# Patient Record
Sex: MALE | Race: OTHER | Employment: OTHER | URBAN - NONMETROPOLITAN AREA
[De-identification: names, ages, dates, MRNs, and addresses within clinical notes are randomized per-mention and may not be internally consistent; named-entity substitution may affect disease eponyms.]

---

## 2021-03-15 RX ORDER — FUROSEMIDE 40 MG/1
40 TABLET ORAL DAILY
COMMUNITY

## 2021-03-15 RX ORDER — POLYETHYLENE GLYCOL 3350 17 G/17G
17 POWDER, FOR SOLUTION ORAL PRN
COMMUNITY

## 2021-03-15 RX ORDER — BACLOFEN 10 MG/1
TABLET ORAL 4 TIMES DAILY PRN
COMMUNITY

## 2021-03-15 RX ORDER — ATORVASTATIN CALCIUM 20 MG/1
20 TABLET, FILM COATED ORAL DAILY
COMMUNITY

## 2021-03-15 RX ORDER — FOLIC ACID 1 MG/1
1 TABLET ORAL DAILY
COMMUNITY

## 2021-03-15 RX ORDER — METOPROLOL TARTRATE 50 MG/1
TABLET, FILM COATED ORAL 2 TIMES DAILY
COMMUNITY

## 2021-03-15 RX ORDER — TRAMADOL HYDROCHLORIDE 50 MG/1
50 TABLET ORAL EVERY 6 HOURS PRN
Status: ON HOLD | COMMUNITY
End: 2021-03-19 | Stop reason: HOSPADM

## 2021-03-15 RX ORDER — MELOXICAM 15 MG/1
15 TABLET ORAL DAILY
COMMUNITY

## 2021-03-15 RX ORDER — ZOLPIDEM TARTRATE 10 MG/1
TABLET ORAL NIGHTLY PRN
COMMUNITY

## 2021-03-15 RX ORDER — ASPIRIN 81 MG/1
81 TABLET ORAL DAILY
COMMUNITY

## 2021-03-15 NOTE — PROGRESS NOTES
Following instructions given to patient, who states understanding:    NPO after midnight  [employer] and 's license  Wear comfortable clean clothing  Do not bring jewelry   Shower night before and morning of surgery with a liquid antibacterial soap  Bring medications in original bottles  Follow all instructions given by your physician   needed at discharge  Call [phone] for any questions  Report to [facility] on 2nd floor  If you would become ill prior to surgery, please call the surgeon  May have only 1 visitor accompany you for surgery  Please bring and wear mask  You will be receiving a phone call one or two days before surgery to review covid screening exposure

## 2021-03-15 NOTE — PROGRESS NOTES
In preparation for their surgical procedure above patient was screened for Obstructive Sleep Apnea (GIN) using the STOP-Bang Questionnaire by the Pre-Admission Testing department. This is a pre-surgical screening tool for patient safety and serves as a recommendation, this WILL NOT cause cancellation of surgery. STOP-Bang Questionnaire  * Do you currently see a pulmonologist?  No     If yes STOP, do not complete. Patient follows with Dr.     1.  Do you snore loudly (able to be heard in the next room)? No    2. Do you often feel tired or sleepy during the daytime? No       3. Has anyone ever told you that you stop breathing during your sleep? No    4. Do you have or are you being treated for high blood pressure? Yes      5. BMI more than 35? BMI (Calculated): 31.9        No    6. Age over 48 years? 63 y.o. Yes    7. Neck Circumference greater than 17 inches for male or 16 inches for female? Measured           (visits only)            Not Applicable    8. Gender Male? Yes      TOTAL SCORE: 3    GIN - Low Risk : Yes to 0 - 2 questions  GIN - Intermediate Risk : Yes to 3 - 4 questions  GIN - High Risk : Yes to 5 - 8 questions    Adapted from:   STOP Questionnaire: A Tool to Screen Patients for Obstructive Sleep Apnea   JENNA BraswellC.P.C., John Chavez M.B.B.S., London Brown M.D., Franca Mary. Nakul Chau, Ph.D., PRAMOD Snyder.B.B.S., Ingrid Rowland M.Sc., Herman Gan M.D., Kamar Leo. JENNA JohnsonC.P.C.    Anesthesiology 2008; 401:211-52 Copyright 2008, the [address] [employer]est[employer], tan[address] 37.   ----------------------------------------------------------------------------------------------------------------

## 2021-03-17 ENCOUNTER — ANESTHESIA EVENT (OUTPATIENT)
Dept: OPERATING ROOM | Age: 66
End: 2021-03-17
Payer: OTHER GOVERNMENT

## 2021-03-18 ENCOUNTER — APPOINTMENT (OUTPATIENT)
Dept: GENERAL RADIOLOGY | Age: 66
End: 2021-03-18
Attending: ORTHOPAEDIC SURGERY
Payer: OTHER GOVERNMENT

## 2021-03-18 ENCOUNTER — HOSPITAL ENCOUNTER (OUTPATIENT)
Age: 66
Discharge: HOME | End: 2021-03-19
Attending: ORTHOPAEDIC SURGERY | Admitting: ORTHOPAEDIC SURGERY
Payer: OTHER GOVERNMENT

## 2021-03-18 ENCOUNTER — ANESTHESIA (OUTPATIENT)
Dept: OPERATING ROOM | Age: 66
End: 2021-03-18
Payer: OTHER GOVERNMENT

## 2021-03-18 VITALS — TEMPERATURE: 97.5 F | SYSTOLIC BLOOD PRESSURE: 101 MMHG | OXYGEN SATURATION: 98 % | DIASTOLIC BLOOD PRESSURE: 58 MMHG

## 2021-03-18 DIAGNOSIS — M75.111 NONTRAUMATIC INCOMPLETE TEAR OF RIGHT ROTATOR CUFF: Primary | ICD-10-CM

## 2021-03-18 LAB — POTASSIUM SERPL-SCNC: 3.8 MEQ/L (ref 3.5–5.2)

## 2021-03-18 PROCEDURE — 2580000003 HC RX 258: Performed by: PHYSICIAN ASSISTANT

## 2021-03-18 PROCEDURE — 2500000003 HC RX 250 WO HCPCS: Performed by: ORTHOPAEDIC SURGERY

## 2021-03-18 PROCEDURE — 64415 NJX AA&/STRD BRCH PLXS IMG: CPT | Performed by: ANESTHESIOLOGY

## 2021-03-18 PROCEDURE — 2580000003 HC RX 258: Performed by: ORTHOPAEDIC SURGERY

## 2021-03-18 PROCEDURE — 73030 X-RAY EXAM OF SHOULDER: CPT

## 2021-03-18 PROCEDURE — 6360000002 HC RX W HCPCS: Performed by: ANESTHESIOLOGY

## 2021-03-18 PROCEDURE — 2709999900 HC NON-CHARGEABLE SUPPLY: Performed by: ORTHOPAEDIC SURGERY

## 2021-03-18 PROCEDURE — C1894 INTRO/SHEATH, NON-LASER: HCPCS | Performed by: ORTHOPAEDIC SURGERY

## 2021-03-18 PROCEDURE — 2500000003 HC RX 250 WO HCPCS: Performed by: NURSE ANESTHETIST, CERTIFIED REGISTERED

## 2021-03-18 PROCEDURE — 2720000010 HC SURG SUPPLY STERILE: Performed by: ORTHOPAEDIC SURGERY

## 2021-03-18 PROCEDURE — 36415 COLL VENOUS BLD VENIPUNCTURE: CPT

## 2021-03-18 PROCEDURE — 3700000000 HC ANESTHESIA ATTENDED CARE: Performed by: ORTHOPAEDIC SURGERY

## 2021-03-18 PROCEDURE — 6370000000 HC RX 637 (ALT 250 FOR IP): Performed by: PHYSICIAN ASSISTANT

## 2021-03-18 PROCEDURE — 7100000001 HC PACU RECOVERY - ADDTL 15 MIN: Performed by: ORTHOPAEDIC SURGERY

## 2021-03-18 PROCEDURE — 3600000005 HC SURGERY LEVEL 5 BASE: Performed by: ORTHOPAEDIC SURGERY

## 2021-03-18 PROCEDURE — 6360000002 HC RX W HCPCS: Performed by: PHYSICIAN ASSISTANT

## 2021-03-18 PROCEDURE — 3700000001 HC ADD 15 MINUTES (ANESTHESIA): Performed by: ORTHOPAEDIC SURGERY

## 2021-03-18 PROCEDURE — 6360000002 HC RX W HCPCS: Performed by: ORTHOPAEDIC SURGERY

## 2021-03-18 PROCEDURE — 6360000002 HC RX W HCPCS: Performed by: NURSE ANESTHETIST, CERTIFIED REGISTERED

## 2021-03-18 PROCEDURE — 84132 ASSAY OF SERUM POTASSIUM: CPT

## 2021-03-18 PROCEDURE — C1776 JOINT DEVICE (IMPLANTABLE): HCPCS | Performed by: ORTHOPAEDIC SURGERY

## 2021-03-18 PROCEDURE — 3600000015 HC SURGERY LEVEL 5 ADDTL 15MIN: Performed by: ORTHOPAEDIC SURGERY

## 2021-03-18 PROCEDURE — C1713 ANCHOR/SCREW BN/BN,TIS/BN: HCPCS | Performed by: ORTHOPAEDIC SURGERY

## 2021-03-18 PROCEDURE — 7100000000 HC PACU RECOVERY - FIRST 15 MIN: Performed by: ORTHOPAEDIC SURGERY

## 2021-03-18 PROCEDURE — 6360000002 HC RX W HCPCS

## 2021-03-18 DEVICE — IMPLANTABLE DEVICE: Type: IMPLANTABLE DEVICE | Site: SHOULDER | Status: FUNCTIONAL

## 2021-03-18 DEVICE — SHOULDR S3 TOT ADV REVRS IMPL CAPPED S3: Type: IMPLANTABLE DEVICE | Status: FUNCTIONAL

## 2021-03-18 DEVICE — SCREW BNE L25MM DIA6.5MM HEX HD DIA3.5MM FOR COMPHSVE CONV: Type: IMPLANTABLE DEVICE | Site: SHOULDER | Status: FUNCTIONAL

## 2021-03-18 DEVICE — STEM HUM 13MM MINI SHLDR CO CHROM COMPHSVE REV PRI CEM: Type: IMPLANTABLE DEVICE | Site: SHOULDER | Status: FUNCTIONAL

## 2021-03-18 DEVICE — SCREW BNE L30MM DIA4.75MM HD DIA3.5MM CORT FIX ANG: Type: IMPLANTABLE DEVICE | Site: SHOULDER | Status: FUNCTIONAL

## 2021-03-18 DEVICE — Z DUP USE 2418442 HMRL TRAY STD STD: Type: IMPLANTABLE DEVICE | Site: SHOULDER | Status: FUNCTIONAL

## 2021-03-18 DEVICE — Z DUP USE 2418441 HMRL BEARING 36MM STD PRLNG: Type: IMPLANTABLE DEVICE | Site: SHOULDER | Status: FUNCTIONAL

## 2021-03-18 DEVICE — SPHERE GLEN DIA36MM REG STD CO CHROM TAPR FIT FOR COMPHSVE: Type: IMPLANTABLE DEVICE | Site: SHOULDER | Status: FUNCTIONAL

## 2021-03-18 DEVICE — SCREW BNE L25MM DIA4.75MM HD DIA3.5MM CORT FIX ANG: Type: IMPLANTABLE DEVICE | Site: SHOULDER | Status: FUNCTIONAL

## 2021-03-18 RX ORDER — ACETAMINOPHEN 325 MG/1
650 TABLET ORAL EVERY 6 HOURS
Status: DISCONTINUED | OUTPATIENT
Start: 2021-03-18 | End: 2021-03-19 | Stop reason: HOSPADM

## 2021-03-18 RX ORDER — ASPIRIN 81 MG/1
81 TABLET ORAL DAILY
Status: DISCONTINUED | OUTPATIENT
Start: 2021-03-18 | End: 2021-03-19 | Stop reason: HOSPADM

## 2021-03-18 RX ORDER — BACLOFEN 10 MG/1
5 TABLET ORAL 4 TIMES DAILY PRN
Status: DISCONTINUED | OUTPATIENT
Start: 2021-03-18 | End: 2021-03-19 | Stop reason: HOSPADM

## 2021-03-18 RX ORDER — MIDAZOLAM HYDROCHLORIDE 1 MG/ML
INJECTION INTRAMUSCULAR; INTRAVENOUS
Status: COMPLETED
Start: 2021-03-18 | End: 2021-03-18

## 2021-03-18 RX ORDER — ROPIVACAINE HYDROCHLORIDE 5 MG/ML
INJECTION, SOLUTION EPIDURAL; INFILTRATION; PERINEURAL
Status: COMPLETED | OUTPATIENT
Start: 2021-03-18 | End: 2021-03-18

## 2021-03-18 RX ORDER — PROMETHAZINE HYDROCHLORIDE 25 MG/1
12.5 TABLET ORAL EVERY 6 HOURS PRN
Status: DISCONTINUED | OUTPATIENT
Start: 2021-03-18 | End: 2021-03-19 | Stop reason: HOSPADM

## 2021-03-18 RX ORDER — FOLIC ACID 1 MG/1
1 TABLET ORAL DAILY
Status: DISCONTINUED | OUTPATIENT
Start: 2021-03-18 | End: 2021-03-19 | Stop reason: HOSPADM

## 2021-03-18 RX ORDER — SUCCINYLCHOLINE/SOD CL,ISO/PF 200MG/10ML
SYRINGE (ML) INTRAVENOUS PRN
Status: DISCONTINUED | OUTPATIENT
Start: 2021-03-18 | End: 2021-03-18 | Stop reason: SDUPTHER

## 2021-03-18 RX ORDER — ONDANSETRON 2 MG/ML
4 INJECTION INTRAMUSCULAR; INTRAVENOUS EVERY 6 HOURS PRN
Status: DISCONTINUED | OUTPATIENT
Start: 2021-03-18 | End: 2021-03-19 | Stop reason: HOSPADM

## 2021-03-18 RX ORDER — FENTANYL CITRATE 50 UG/ML
100 INJECTION, SOLUTION INTRAMUSCULAR; INTRAVENOUS ONCE
Status: COMPLETED | OUTPATIENT
Start: 2021-03-18 | End: 2021-03-18

## 2021-03-18 RX ORDER — DEXAMETHASONE SODIUM PHOSPHATE 4 MG/ML
INJECTION, SOLUTION INTRA-ARTICULAR; INTRALESIONAL; INTRAMUSCULAR; INTRAVENOUS; SOFT TISSUE PRN
Status: DISCONTINUED | OUTPATIENT
Start: 2021-03-18 | End: 2021-03-18 | Stop reason: SDUPTHER

## 2021-03-18 RX ORDER — SODIUM CHLORIDE 9 MG/ML
INJECTION, SOLUTION INTRAVENOUS CONTINUOUS
Status: DISCONTINUED | OUTPATIENT
Start: 2021-03-18 | End: 2021-03-19 | Stop reason: HOSPADM

## 2021-03-18 RX ORDER — LIDOCAINE HCL/PF 100 MG/5ML
SYRINGE (ML) INJECTION PRN
Status: DISCONTINUED | OUTPATIENT
Start: 2021-03-18 | End: 2021-03-18 | Stop reason: SDUPTHER

## 2021-03-18 RX ORDER — MORPHINE SULFATE 2 MG/ML
2 INJECTION, SOLUTION INTRAMUSCULAR; INTRAVENOUS
Status: DISCONTINUED | OUTPATIENT
Start: 2021-03-18 | End: 2021-03-19 | Stop reason: HOSPADM

## 2021-03-18 RX ORDER — SODIUM CHLORIDE 0.9 % (FLUSH) 0.9 %
10 SYRINGE (ML) INJECTION EVERY 12 HOURS SCHEDULED
Status: DISCONTINUED | OUTPATIENT
Start: 2021-03-18 | End: 2021-03-19 | Stop reason: HOSPADM

## 2021-03-18 RX ORDER — SODIUM CHLORIDE 0.9 % (FLUSH) 0.9 %
10 SYRINGE (ML) INJECTION PRN
Status: DISCONTINUED | OUTPATIENT
Start: 2021-03-18 | End: 2021-03-19 | Stop reason: HOSPADM

## 2021-03-18 RX ORDER — OXYCODONE HYDROCHLORIDE 5 MG/1
5 TABLET ORAL EVERY 4 HOURS PRN
Status: DISCONTINUED | OUTPATIENT
Start: 2021-03-18 | End: 2021-03-19 | Stop reason: HOSPADM

## 2021-03-18 RX ORDER — EPHEDRINE SULFATE 50 MG/ML
INJECTION INTRAVENOUS PRN
Status: DISCONTINUED | OUTPATIENT
Start: 2021-03-18 | End: 2021-03-18 | Stop reason: SDUPTHER

## 2021-03-18 RX ORDER — FUROSEMIDE 40 MG/1
40 TABLET ORAL DAILY
Status: DISCONTINUED | OUTPATIENT
Start: 2021-03-18 | End: 2021-03-19 | Stop reason: HOSPADM

## 2021-03-18 RX ORDER — ROCURONIUM BROMIDE 10 MG/ML
INJECTION, SOLUTION INTRAVENOUS PRN
Status: DISCONTINUED | OUTPATIENT
Start: 2021-03-18 | End: 2021-03-18 | Stop reason: SDUPTHER

## 2021-03-18 RX ORDER — LABETALOL HYDROCHLORIDE 5 MG/ML
INJECTION, SOLUTION INTRAVENOUS PRN
Status: DISCONTINUED | OUTPATIENT
Start: 2021-03-18 | End: 2021-03-18 | Stop reason: SDUPTHER

## 2021-03-18 RX ORDER — SENNA AND DOCUSATE SODIUM 50; 8.6 MG/1; MG/1
1 TABLET, FILM COATED ORAL 2 TIMES DAILY
Status: DISCONTINUED | OUTPATIENT
Start: 2021-03-18 | End: 2021-03-19 | Stop reason: HOSPADM

## 2021-03-18 RX ORDER — GLYCOPYRROLATE 1 MG/5 ML
SYRINGE (ML) INTRAVENOUS PRN
Status: DISCONTINUED | OUTPATIENT
Start: 2021-03-18 | End: 2021-03-18 | Stop reason: SDUPTHER

## 2021-03-18 RX ORDER — FENTANYL CITRATE 50 UG/ML
INJECTION, SOLUTION INTRAMUSCULAR; INTRAVENOUS PRN
Status: DISCONTINUED | OUTPATIENT
Start: 2021-03-18 | End: 2021-03-18 | Stop reason: SDUPTHER

## 2021-03-18 RX ORDER — BUPIVACAINE HYDROCHLORIDE 5 MG/ML
INJECTION, SOLUTION EPIDURAL; INTRACAUDAL PRN
Status: DISCONTINUED | OUTPATIENT
Start: 2021-03-18 | End: 2021-03-18 | Stop reason: ALTCHOICE

## 2021-03-18 RX ORDER — ATORVASTATIN CALCIUM 20 MG/1
20 TABLET, FILM COATED ORAL DAILY
Status: DISCONTINUED | OUTPATIENT
Start: 2021-03-18 | End: 2021-03-19 | Stop reason: HOSPADM

## 2021-03-18 RX ORDER — ONDANSETRON 2 MG/ML
INJECTION INTRAMUSCULAR; INTRAVENOUS PRN
Status: DISCONTINUED | OUTPATIENT
Start: 2021-03-18 | End: 2021-03-18 | Stop reason: SDUPTHER

## 2021-03-18 RX ORDER — OXYCODONE HYDROCHLORIDE 5 MG/1
10 TABLET ORAL EVERY 4 HOURS PRN
Status: DISCONTINUED | OUTPATIENT
Start: 2021-03-18 | End: 2021-03-19 | Stop reason: HOSPADM

## 2021-03-18 RX ORDER — ESMOLOL HYDROCHLORIDE 10 MG/ML
INJECTION INTRAVENOUS PRN
Status: DISCONTINUED | OUTPATIENT
Start: 2021-03-18 | End: 2021-03-18 | Stop reason: SDUPTHER

## 2021-03-18 RX ORDER — MORPHINE SULFATE 4 MG/ML
4 INJECTION, SOLUTION INTRAMUSCULAR; INTRAVENOUS
Status: DISCONTINUED | OUTPATIENT
Start: 2021-03-18 | End: 2021-03-19 | Stop reason: HOSPADM

## 2021-03-18 RX ORDER — KETOROLAC TROMETHAMINE 30 MG/ML
INJECTION, SOLUTION INTRAMUSCULAR; INTRAVENOUS PRN
Status: DISCONTINUED | OUTPATIENT
Start: 2021-03-18 | End: 2021-03-18 | Stop reason: SDUPTHER

## 2021-03-18 RX ORDER — FENTANYL CITRATE 50 UG/ML
INJECTION, SOLUTION INTRAMUSCULAR; INTRAVENOUS
Status: COMPLETED
Start: 2021-03-18 | End: 2021-03-18

## 2021-03-18 RX ORDER — PROPOFOL 10 MG/ML
INJECTION, EMULSION INTRAVENOUS PRN
Status: DISCONTINUED | OUTPATIENT
Start: 2021-03-18 | End: 2021-03-18 | Stop reason: SDUPTHER

## 2021-03-18 RX ORDER — MIDAZOLAM HYDROCHLORIDE 2 MG/2ML
2 INJECTION, SOLUTION INTRAMUSCULAR; INTRAVENOUS ONCE
Status: COMPLETED | OUTPATIENT
Start: 2021-03-18 | End: 2021-03-18

## 2021-03-18 RX ORDER — ZOLPIDEM TARTRATE 5 MG/1
5 TABLET ORAL NIGHTLY PRN
Status: DISCONTINUED | OUTPATIENT
Start: 2021-03-18 | End: 2021-03-19 | Stop reason: HOSPADM

## 2021-03-18 RX ADMIN — DEXAMETHASONE SODIUM PHOSPHATE 8 MG: 4 INJECTION, SOLUTION INTRAMUSCULAR; INTRAVENOUS at 08:25

## 2021-03-18 RX ADMIN — EPHEDRINE SULFATE 20 MG: 50 INJECTION, SOLUTION INTRAVENOUS at 08:35

## 2021-03-18 RX ADMIN — ROCURONIUM BROMIDE 20 MG: 10 INJECTION INTRAVENOUS at 09:04

## 2021-03-18 RX ADMIN — FUROSEMIDE 40 MG: 40 TABLET ORAL at 13:21

## 2021-03-18 RX ADMIN — SODIUM CHLORIDE: 9 INJECTION, SOLUTION INTRAVENOUS at 06:22

## 2021-03-18 RX ADMIN — SODIUM CHLORIDE: 9 INJECTION, SOLUTION INTRAVENOUS at 07:24

## 2021-03-18 RX ADMIN — LABETALOL HYDROCHLORIDE 10 MG: 5 INJECTION INTRAVENOUS at 08:46

## 2021-03-18 RX ADMIN — ROCURONIUM BROMIDE 40 MG: 10 INJECTION INTRAVENOUS at 07:48

## 2021-03-18 RX ADMIN — PROPOFOL 100 MG: 10 INJECTION, EMULSION INTRAVENOUS at 07:49

## 2021-03-18 RX ADMIN — Medication 200 MG: at 07:40

## 2021-03-18 RX ADMIN — ATORVASTATIN CALCIUM 20 MG: 20 TABLET, FILM COATED ORAL at 13:21

## 2021-03-18 RX ADMIN — DOCUSATE SODIUM 50 MG AND SENNOSIDES 8.6 MG 1 TABLET: 8.6; 5 TABLET, FILM COATED ORAL at 20:15

## 2021-03-18 RX ADMIN — Medication 100 MG: at 07:40

## 2021-03-18 RX ADMIN — Medication 0.4 MG: at 08:20

## 2021-03-18 RX ADMIN — FENTANYL CITRATE 100 MCG: 50 INJECTION, SOLUTION INTRAMUSCULAR; INTRAVENOUS at 07:03

## 2021-03-18 RX ADMIN — CEFAZOLIN 2 G: 10 INJECTION, POWDER, FOR SOLUTION INTRAVENOUS at 07:30

## 2021-03-18 RX ADMIN — SODIUM CHLORIDE: 9 INJECTION, SOLUTION INTRAVENOUS at 20:58

## 2021-03-18 RX ADMIN — ACETAMINOPHEN 650 MG: 325 TABLET ORAL at 18:07

## 2021-03-18 RX ADMIN — ACETAMINOPHEN 650 MG: 325 TABLET ORAL at 23:38

## 2021-03-18 RX ADMIN — LABETALOL HYDROCHLORIDE 10 MG: 5 INJECTION INTRAVENOUS at 09:30

## 2021-03-18 RX ADMIN — ONDANSETRON HYDROCHLORIDE 4 MG: 4 INJECTION, SOLUTION INTRAMUSCULAR; INTRAVENOUS at 09:43

## 2021-03-18 RX ADMIN — CEFAZOLIN 2000 MG: 10 INJECTION, POWDER, FOR SOLUTION INTRAVENOUS at 23:41

## 2021-03-18 RX ADMIN — ROCURONIUM BROMIDE 10 MG: 10 INJECTION INTRAVENOUS at 07:40

## 2021-03-18 RX ADMIN — MIDAZOLAM HYDROCHLORIDE 2 MG: 2 INJECTION, SOLUTION INTRAMUSCULAR; INTRAVENOUS at 07:08

## 2021-03-18 RX ADMIN — CEFAZOLIN 2000 MG: 10 INJECTION, POWDER, FOR SOLUTION INTRAVENOUS at 16:04

## 2021-03-18 RX ADMIN — KETOROLAC TROMETHAMINE 30 MG: 60 INJECTION, SOLUTION INTRAMUSCULAR at 09:44

## 2021-03-18 RX ADMIN — SUGAMMADEX 200 MG: 100 INJECTION, SOLUTION INTRAVENOUS at 09:46

## 2021-03-18 RX ADMIN — OXYCODONE 5 MG: 5 TABLET ORAL at 13:21

## 2021-03-18 RX ADMIN — METOPROLOL TARTRATE 25 MG: 25 TABLET ORAL at 20:15

## 2021-03-18 RX ADMIN — FOLIC ACID 1 MG: 1 TABLET ORAL at 13:21

## 2021-03-18 RX ADMIN — ACETAMINOPHEN 650 MG: 325 TABLET ORAL at 13:21

## 2021-03-18 RX ADMIN — METOPROLOL TARTRATE 25 MG: 25 TABLET ORAL at 13:21

## 2021-03-18 RX ADMIN — PROPOFOL 200 MG: 10 INJECTION, EMULSION INTRAVENOUS at 07:40

## 2021-03-18 RX ADMIN — ROPIVACAINE HYDROCHLORIDE 30 ML: 5 INJECTION, SOLUTION EPIDURAL; INFILTRATION; PERINEURAL at 07:19

## 2021-03-18 RX ADMIN — ASPIRIN 81 MG: 81 TABLET, FILM COATED ORAL at 13:21

## 2021-03-18 RX ADMIN — OXYCODONE 10 MG: 5 TABLET ORAL at 23:38

## 2021-03-18 RX ADMIN — FENTANYL CITRATE 100 MCG: 50 INJECTION, SOLUTION INTRAMUSCULAR; INTRAVENOUS at 08:39

## 2021-03-18 RX ADMIN — FENTANYL CITRATE 100 MCG: 50 INJECTION, SOLUTION INTRAMUSCULAR; INTRAVENOUS at 07:40

## 2021-03-18 RX ADMIN — ESMOLOL HYDROCHLORIDE 20 MG: 10 INJECTION, SOLUTION INTRAVENOUS at 08:44

## 2021-03-18 RX ADMIN — MORPHINE SULFATE 4 MG: 4 INJECTION, SOLUTION INTRAMUSCULAR; INTRAVENOUS at 12:05

## 2021-03-18 RX ADMIN — MIDAZOLAM 2 MG: 1 INJECTION INTRAMUSCULAR; INTRAVENOUS at 07:08

## 2021-03-18 ASSESSMENT — PULMONARY FUNCTION TESTS
PIF_VALUE: 5
PIF_VALUE: 21
PIF_VALUE: 25
PIF_VALUE: 16
PIF_VALUE: 22
PIF_VALUE: 21
PIF_VALUE: 34
PIF_VALUE: 20
PIF_VALUE: 22
PIF_VALUE: 17
PIF_VALUE: 8
PIF_VALUE: 21
PIF_VALUE: 28
PIF_VALUE: 17
PIF_VALUE: 27
PIF_VALUE: 17
PIF_VALUE: 3
PIF_VALUE: 1
PIF_VALUE: 2
PIF_VALUE: 21
PIF_VALUE: 5
PIF_VALUE: 22
PIF_VALUE: 16
PIF_VALUE: 4
PIF_VALUE: 17
PIF_VALUE: 4
PIF_VALUE: 23
PIF_VALUE: 16
PIF_VALUE: 22
PIF_VALUE: 1
PIF_VALUE: 7
PIF_VALUE: 22
PIF_VALUE: 16
PIF_VALUE: 5
PIF_VALUE: 26
PIF_VALUE: 22
PIF_VALUE: 24
PIF_VALUE: 20
PIF_VALUE: 16
PIF_VALUE: 21
PIF_VALUE: 30
PIF_VALUE: 21
PIF_VALUE: 17
PIF_VALUE: 1
PIF_VALUE: 22
PIF_VALUE: 17
PIF_VALUE: 22
PIF_VALUE: 21
PIF_VALUE: 2
PIF_VALUE: 9
PIF_VALUE: 21
PIF_VALUE: 31
PIF_VALUE: 16
PIF_VALUE: 21
PIF_VALUE: 16
PIF_VALUE: 22
PIF_VALUE: 20
PIF_VALUE: 5
PIF_VALUE: 27
PIF_VALUE: 5
PIF_VALUE: 2
PIF_VALUE: 21
PIF_VALUE: 5
PIF_VALUE: 22
PIF_VALUE: 2
PIF_VALUE: 22
PIF_VALUE: 2
PIF_VALUE: 26
PIF_VALUE: 2
PIF_VALUE: 3
PIF_VALUE: 4
PIF_VALUE: 5
PIF_VALUE: 22
PIF_VALUE: 21
PIF_VALUE: 7
PIF_VALUE: 21
PIF_VALUE: 3
PIF_VALUE: 24
PIF_VALUE: 22
PIF_VALUE: 22
PIF_VALUE: 16
PIF_VALUE: 21

## 2021-03-18 ASSESSMENT — PAIN SCALES - GENERAL
PAINLEVEL_OUTOF10: 4
PAINLEVEL_OUTOF10: 4
PAINLEVEL_OUTOF10: 3
PAINLEVEL_OUTOF10: 7
PAINLEVEL_OUTOF10: 7
PAINLEVEL_OUTOF10: 6
PAINLEVEL_OUTOF10: 6

## 2021-03-18 ASSESSMENT — PAIN DESCRIPTION - FREQUENCY: FREQUENCY: CONTINUOUS

## 2021-03-18 ASSESSMENT — PAIN DESCRIPTION - LOCATION: LOCATION: SHOULDER

## 2021-03-18 ASSESSMENT — PAIN DESCRIPTION - ORIENTATION: ORIENTATION: LEFT

## 2021-03-18 ASSESSMENT — PAIN DESCRIPTION - PAIN TYPE: TYPE: ACUTE PAIN;SURGICAL PAIN

## 2021-03-18 ASSESSMENT — PAIN - FUNCTIONAL ASSESSMENT: PAIN_FUNCTIONAL_ASSESSMENT: PREVENTS OR INTERFERES SOME ACTIVE ACTIVITIES AND ADLS

## 2021-03-18 NOTE — ANESTHESIA POSTPROCEDURE EVALUATION
Department of Anesthesiology  Postprocedure Note    Patient: Ingrid Bowen  MRN: [id number]  YOB: 1955  Date of evaluation: 3/18/2021  Time:  10:59 AM     Procedure Summary     Date: 03/18/21 [facility] [address] [facility]    Anesthesia Start: 0728 Anesthesia Stop: 1020    Procedure: RIGHT REVERSE TOTAL SHOULDER (Right Shoulder) Diagnosis: (ARTHRITIS OF RIGHT SHOULDER, INCOMPLETE TEAR OF RIGHT ROTATOR CUFF)    Surgeons: Federico Mae DO Responsible Provider: Rich Hannon DO    Anesthesia Type: general, regional ASA Status: 2          Anesthesia Type: general, regional    Carson Phase I: Carson Score: 9    Carson Phase II:      Last vitals: Reviewed and per EMR flowsheets.        Anesthesia Post Evaluation    Patient location during evaluation: PACU  Patient participation: complete - patient participated  Level of consciousness: awake  Airway patency: patent  Nausea & Vomiting: no nausea and no vomiting  Complications: no  Cardiovascular status: hemodynamically stable  Respiratory status: acceptable  Hydration status: stable

## 2021-03-18 NOTE — PROGRESS NOTES
Oriented to sds   5      Refuses flu and pneumonia vaccine. No family. Keep nothing of value in room unattended. pt was asked and agreed to first name and last initial being put on white boards. Allergy and fall risks applied. SCD Applied to patient. Warming blanket applied to patient. Pt denies any abuse or thoughts of suicide.

## 2021-03-18 NOTE — BRIEF OP NOTE
Brief Postoperative Note      Patient: Jona Tenorio  YOB: 1955  MRN: [id number]    Date of Procedure: 3/18/2021    Pre-Op Diagnosis: ARTHRITIS OF RIGHT SHOULDER, Massive irreparable RCT    Post-Op Diagnosis: Same       Procedure(s):  RIGHT REVERSE TOTAL SHOULDER    Surgeon(s):  Jenn Gr DO    Assistant:  Physician Assistant: Mireya Matt PA-C    Anesthesia: General    Estimated Blood Loss (mL): 916 ml    Complications: None    Specimens:   * No specimens in log *    Implants:  Implant Name Type Inv. Item Serial No.  Lot No. LRB No. Used Action   BASEPLATE JOSE [id number] MINI SHLDR REV TAPR COMPHSVE  BASEPLATE JOSE [id number] MINI SHLDR REV TAPR [facility] [employer]- [id number] Right 1 Implanted   SCREW BNE L25MM DIA6. 5MM HEX HD DIA3. [address] [facility] CONV  SCREW BNE L25MM DIA6. 5MM HEX HD DIA3. 5MM FOR COMPHSVE CONV  [employer]- [id number] Right 1 Implanted   SCREW BNE L25MM DIA4. 75MM HD DIA3.5MM NEIDA FIX ANG  SCREW BNE L25MM DIA4. 75MM HD DIA3.5MM NEIDA FIX ANG  [employer]- [id number] Right 1 Implanted   SCREW BNE L30MM DIA4. 75MM HD DIA3.5MM NEIDA FIX ANG  SCREW BNE L30MM DIA4. 75MM HD DIA3.5MM NEIDA FIX ANG  [employer]- [id number] Right 1 Implanted   SPHERE JOSE [id number] REG STD CO CHROM TAPR FIT FOR COMPHSVE  SPHERE JOSE [id number] REG STD CO CHROM TAPR FIT FOR COMPHSVE  [employer]- [id number] Right 1 Implanted   STEM HUM 13MM MINI SHLDR CO CHROM COMPHSVE REV NAGA DIEUDONNE  STEM HUM 13MM MINI SHLDR CO CHROM COMPHSVE REV NAGA DIEUDONNE  [employer]- [id number] Right 1 Implanted   Dalmatinova 4 STD STD Shoulder/Arm/Wrist/Hand Dalmatinova 4 STD STD  [employer]- [id number] Right 1 Implanted   HMRL BEARING 36MM STD PRLNG Shoulder/Arm/Wrist/Hand HMRL BEARING 36MM STD [facility] [employer]TH[employer]EDICS- [id number] Right 1 Implanted         Drains: * No LDAs found *    Findings: post op diagnosis confirmed    Electronically signed by Red Mackenzie, DO on 3/18/2021 at 9:59 AM

## 2021-03-18 NOTE — PROGRESS NOTES
3/18/2021 650 -     Patient transported to PACU bay for a right shoulder block. Patient connected to monitor. Right shoulder block will be administered per Dr. Shaun Grace.

## 2021-03-18 NOTE — ANESTHESIA PROCEDURE NOTES
Peripheral Block    Patient location during procedure: PACU  Start time: 3/18/2021 7:12 AM  End time: 3/18/2021 7:20 AM  Staffing  Performed: anesthesiologist   Anesthesiologist: Dominique Nickerson MD  Preanesthetic Checklist  Completed: patient identified, IV checked, site marked, risks and benefits discussed, surgical consent, monitors and equipment checked, pre-op evaluation, timeout performed, anesthesia consent given, oxygen available and patient being monitored  Peripheral Block  Patient position: supine  Prep: ChloraPrep  Patient monitoring: continuous pulse ox, frequent blood pressure checks, IV access and cardiac monitor  Block type: Brachial plexus  Laterality: right  Injection technique: single-shot  Guidance: nerve stimulator and ultrasound guided  Local infiltration: lidocaine  Infiltration strength: 1 %  Dose: 2 mL  Interscalene  Provider prep: mask and sterile gloves  Local infiltration: lidocaine  Needle  Needle type: combined needle/nerve stimulator   Needle gauge: 22 G  Needle length: 8 cm  Needle localization: nerve stimulator and ultrasound guidance  Needle insertion depth: 2.1 cm  Test dose: negative  Assessment  Injection assessment: negative aspiration for heme, no paresthesia on injection and local visualized surrounding nerve on ultrasound  Paresthesia pain: none  Slow fractionated injection: yes  Hemodynamics: stable  Medications Administered  Ropivacaine (NAROPIN) injection 0.5%, 30 mL  Reason for block: post-op pain management and at surgeon's request

## 2021-03-18 NOTE — H&P
Update History & Physical    The patient's History and Physical of March 15, 2021 was reviewed with the patient and I examined the patient. There was no change. The surgical site was confirmed by the patient and me. Plan: The risks, benefits, expected outcome, and alternative to the recommended procedure have been discussed with the patient. Patient understands and wants to proceed with the procedure.      Electronically signed by Jenn Gr DO on 3/18/2021 at 7:08 AM

## 2021-03-18 NOTE — ANESTHESIA PRE PROCEDURE
Department of Anesthesiology  Preprocedure Note       Name:  Ran Webster   Age:  63 y.o.  :  1955                                          MRN:  [id number]         Date:  3/18/2021      Surgeon: Sharif Prieto):  Alinda Romberg, DO    Procedure: Procedure(s):  RIGHT REVERSE TOTAL SHOULDER    Medications prior to admission:   Prior to Admission medications    Medication Sig Start Date End Date Taking? Authorizing Provider   metoprolol tartrate (LOPRESSOR) 50 MG tablet Take by mouth 2 times daily 1/2 tab   Yes Historical Provider, MD   atorvastatin (LIPITOR) 20 MG tablet Take 20 mg by mouth daily   Yes Historical Provider, MD   aspirin 81 MG EC tablet Take 81 mg by mouth daily   Yes Historical Provider, MD   meloxicam (MOBIC) 15 MG tablet Take 15 mg by mouth daily   Yes Historical Provider, MD   furosemide (LASIX) 40 MG tablet Take 40 mg by mouth daily   Yes Historical Provider, MD   vitamin D (CHOLECALCIFEROL) 25 MCG (1000 UT) TABS tablet Take by mouth daily 2 tab   Yes Historical Provider, MD   folic acid (FOLVITE) 1 MG tablet Take 1 mg by mouth daily   Yes Historical Provider, MD   traMADol (ULTRAM) 50 MG tablet Take 50 mg by mouth every 6 hours as needed for Pain. Yes Historical Provider, MD   zolpidem (AMBIEN) 10 MG tablet Take by mouth nightly as needed for Sleep. 1/2-1 tab   Yes Historical Provider, MD   baclofen (LIORESAL) 10 MG tablet Take by mouth 4 times daily as needed 1/2 tab   Yes Historical Provider, MD   polyethylene glycol (GLYCOLAX) 17 GM/SCOOP powder Take 17 g by mouth as needed   Yes Historical Provider, MD       Current medications:    Current Facility-Administered Medications   Medication Dose Route Frequency Provider Last Rate Last Admin    0.9 % sodium chloride infusion   Intravenous Continuous Alinda Romberg,  mL/hr at 21 0724 New Bag at 21 07       Allergies:     Allergies   Allergen Reactions    Lyrica [Pregabalin] Shortness Of Breath and Swelling Throat swells    Neurontin [Gabapentin] Shortness Of Breath and Swelling     Throat swells    Pamelor [Nortriptyline] Hives    Effexor [Venlafaxine]      Not sure of reaction       Problem List:    Patient Active Problem List   Diagnosis Code    Incomplete tear of right rotator cuff M75.111       Past Medical History:        Diagnosis Date    Arthritis     Hyperlipidemia     take liptor    Hypertension        Past Surgical History:        Procedure Laterality Date    ELBOW SURGERY Bilateral     HAND SURGERY Left     pinky finger    ROTATOR CUFF REPAIR Left     bicep tenotomy    SHOULDER SURGERY Right     bicep tenotomy       Social History:    Social History     Tobacco Use    Smoking status: Current Every Day Smoker     Packs/day: 0.50     Years: 9.00     Pack years: 4.50     Types: Cigarettes     Start date: 2012    Smokeless tobacco: Never Used   Substance Use Topics    Alcohol use: Yes     Comment: 2-3 drinks social                                Ready to quit: Not Answered  Counseling given: Not Answered      Vital Signs (Current):   Vitals:    03/18/21 0710 03/18/21 0715 03/18/21 0720 03/18/21 0725   BP: (!) 146/74 (!) 153/81 (!) 143/74    Pulse: 83 75 74 71   Resp: 15 17 18 18   Temp:       TempSrc:       SpO2: 95% 95% 95% 95%   Weight:       Height:                                                  BP Readings from Last 3 Encounters:   03/18/21 (!) 143/74       NPO Status: Time of last liquid consumption: 2330                        Time of last solid consumption: 2330                        Date of last liquid consumption: 03/17/21                        Date of last solid food consumption: 03/17/21    BMI:   Wt Readings from Last 3 Encounters:   03/18/21 216 lb 6.4 oz (98.2 kg)     Body mass index is 33.89 kg/m².     CBC: No results found for: WBC, RBC, HGB, HCT, MCV, RDW, PLT    CMP:   Lab Results   Component Value Date    K 3.8 03/18/2021       POC Tests: No results for input(s): POCGLU, Anselm Medicus, POCCL, POCBUN, POCHEMO, POCHCT in the last 72 hours. Coags: No results found for: PROTIME, INR, APTT    HCG (If Applicable): No results found for: PREGTESTUR, PREGSERUM, HCG, HCGQUANT     ABGs: No results found for: PHART, PO2ART, EHU9ZBR, XFU3JEF, BEART, X2PQCCNZ     Type & Screen (If Applicable):  No results found for: LABABO, LABRH    Drug/Infectious Status (If Applicable):  No results found for: HIV, HEPCAB    COVID-19 Screening (If Applicable): No results found for: COVID19        Anesthesia Evaluation  Patient summary reviewed and Nursing notes reviewed no history of anesthetic complications:   Airway: Mallampati: II        Dental:          Pulmonary: breath sounds clear to auscultation                             Cardiovascular:    (+) hypertension:,         Rhythm: regular  Rate: normal                    Neuro/Psych:               GI/Hepatic/Renal:             Endo/Other:                     Abdominal:       Abdomen: soft. Vascular:                                        Anesthesia Plan      general and regional     ASA 2       Induction: intravenous. MIPS: Postoperative opioids intended and Prophylactic antiemetics administered. Anesthetic plan and risks discussed with patient. Plan discussed with CRNA.                   [address] [place] [address],    3/18/2021

## 2021-03-18 NOTE — ANESTHESIA PRE-OP
0700 Pt in PACU for Right Interscalene Block. Dr. Chencho Johnson here.  Ranjan Yarbrough completed. Fentanyl given IV.  [id number] Dr. Andrew Chou here.  5493 Versed IV given. 0720 Right Interscalene Nerve Block completed. 3482 To OR/[facility].

## 2021-03-18 NOTE — CARE COORDINATION
3/18/21, 2:54 PM EDT  DISCHARGE PLANNING EVALUATION:    Demario Smith       Admitted: 3/18/2021/ Michael 93 day: 0   Location: -07/007-A Reason for admit: Incomplete tear of right rotator cuff [M75.111]   PMH:  has a past medical history of Arthritis, Hyperlipidemia, and Hypertension. Procedure: 3/18/21 Surgery by Dr Torri Russo: RIGHT REVERSE TOTAL SHOULDER     Barriers to Discharge:  Right shoulder immobilizer. Ambulate with assistance. Check operative dressing with vital signs and leaving dressing clean, dry and intact. I&O. Pain management. N/V checks. PCP: uses [facility] connected with [facility] Dr Rebeka Thompson is PCP   %    Patient Goals/Plan/Treatment Preferences:I spoke with Mo. He is planning to go home alone tomorrow. No discharge needs voiced. He uses [facility] connected with burrp!. Dr Rebeka Thompson is PCP    Transportation/Food Security/Housekeeping Addressed:  No issues identified.

## 2021-03-18 NOTE — PROGRESS NOTES
1019: pt arrived to pacu. Pt awake alert and oriented. No needs expressed. Right shoulder dressing cdi. Brace in use. Ice applied. palpable pulses warm fingers. Pt did have a block but is able to move all fingers on the right side.    1025: RN called report to [employer]   9169: pt resting easy no needs expressed

## 2021-03-19 VITALS
DIASTOLIC BLOOD PRESSURE: 71 MMHG | HEART RATE: 68 BPM | SYSTOLIC BLOOD PRESSURE: 133 MMHG | RESPIRATION RATE: 16 BRPM | HEIGHT: 67 IN | BODY MASS INDEX: 33.97 KG/M2 | OXYGEN SATURATION: 95 % | TEMPERATURE: 98.4 F | WEIGHT: 216.4 LBS

## 2021-03-19 LAB
ANION GAP SERPL CALCULATED.3IONS-SCNC: 10 MEQ/L (ref 8–16)
BUN BLDV-MCNC: 20 MG/DL (ref 7–22)
CALCIUM SERPL-MCNC: 8.1 MG/DL (ref 8.5–10.5)
CHLORIDE BLD-SCNC: 105 MEQ/L (ref 98–111)
CO2: 20 MEQ/L (ref 23–33)
CREAT SERPL-MCNC: 1.1 MG/DL (ref 0.4–1.2)
ERYTHROCYTE [DISTWIDTH] IN BLOOD BY AUTOMATED COUNT: 12.2 % (ref 11.5–14.5)
ERYTHROCYTE [DISTWIDTH] IN BLOOD BY AUTOMATED COUNT: 45.8 FL (ref 35–45)
GFR SERPL CREATININE-BSD FRML MDRD: 68 ML/MIN/1.73M2
GLUCOSE BLD-MCNC: 117 MG/DL (ref 70–108)
HCT VFR BLD CALC: 31 % (ref 42–52)
HEMOGLOBIN: 9.7 GM/DL (ref 14–18)
MCH RBC QN AUTO: 32.3 PG (ref 26–33)
MCHC RBC AUTO-ENTMCNC: 31.3 GM/DL (ref 32.2–35.5)
MCV RBC AUTO: 103.3 FL (ref 80–94)
PLATELET # BLD: 106 THOU/MM3 (ref 130–400)
PMV BLD AUTO: 12.1 FL (ref 9.4–12.4)
POTASSIUM SERPL-SCNC: 4.2 MEQ/L (ref 3.5–5.2)
RBC # BLD: 3 MILL/MM3 (ref 4.7–6.1)
SODIUM BLD-SCNC: 135 MEQ/L (ref 135–145)
WBC # BLD: 8.5 THOU/MM3 (ref 4.8–10.8)

## 2021-03-19 PROCEDURE — 6370000000 HC RX 637 (ALT 250 FOR IP): Performed by: PHYSICIAN ASSISTANT

## 2021-03-19 PROCEDURE — 80048 BASIC METABOLIC PNL TOTAL CA: CPT

## 2021-03-19 PROCEDURE — 85027 COMPLETE CBC AUTOMATED: CPT

## 2021-03-19 PROCEDURE — 36415 COLL VENOUS BLD VENIPUNCTURE: CPT

## 2021-03-19 RX ORDER — HYDROCODONE BITARTRATE AND ACETAMINOPHEN 5; 325 MG/1; MG/1
1-2 TABLET ORAL
Qty: 42 TABLET | Refills: 0 | Status: SHIPPED | OUTPATIENT
Start: 2021-03-19 | End: 2021-03-19 | Stop reason: SDUPTHER

## 2021-03-19 RX ORDER — HYDROCODONE BITARTRATE AND ACETAMINOPHEN 5; 325 MG/1; MG/1
1-2 TABLET ORAL EVERY 4 HOURS PRN
Qty: 42 TABLET | Refills: 0 | Status: SHIPPED | OUTPATIENT
Start: 2021-03-19 | End: 2021-03-26

## 2021-03-19 RX ADMIN — METOPROLOL TARTRATE 25 MG: 25 TABLET ORAL at 08:00

## 2021-03-19 RX ADMIN — OXYCODONE 10 MG: 5 TABLET ORAL at 12:46

## 2021-03-19 RX ADMIN — ATORVASTATIN CALCIUM 20 MG: 20 TABLET, FILM COATED ORAL at 07:56

## 2021-03-19 RX ADMIN — ASPIRIN 81 MG: 81 TABLET, FILM COATED ORAL at 07:56

## 2021-03-19 RX ADMIN — OXYCODONE 5 MG: 5 TABLET ORAL at 04:35

## 2021-03-19 RX ADMIN — OXYCODONE 10 MG: 5 TABLET ORAL at 08:25

## 2021-03-19 RX ADMIN — FUROSEMIDE 40 MG: 40 TABLET ORAL at 07:56

## 2021-03-19 RX ADMIN — ACETAMINOPHEN 650 MG: 325 TABLET ORAL at 06:45

## 2021-03-19 RX ADMIN — FOLIC ACID 1 MG: 1 TABLET ORAL at 07:56

## 2021-03-19 ASSESSMENT — PAIN DESCRIPTION - FREQUENCY: FREQUENCY: CONTINUOUS

## 2021-03-19 ASSESSMENT — PAIN SCALES - GENERAL
PAINLEVEL_OUTOF10: 5
PAINLEVEL_OUTOF10: 7
PAINLEVEL_OUTOF10: 5

## 2021-03-19 ASSESSMENT — PAIN - FUNCTIONAL ASSESSMENT: PAIN_FUNCTIONAL_ASSESSMENT: ACTIVITIES ARE NOT PREVENTED

## 2021-03-19 ASSESSMENT — PAIN DESCRIPTION - ONSET: ONSET: ON-GOING

## 2021-03-19 ASSESSMENT — PAIN DESCRIPTION - DESCRIPTORS: DESCRIPTORS: ACHING

## 2021-03-19 NOTE — PLAN OF CARE
Problem: Pain:  Goal: Pain level will decrease  Description: Pain level will decrease  Outcome: Ongoing  Note: Patient reports pain at a 5 on scale. Patient states oral medication helping to achieve pain goal of no pain on scale. Patient able to reposition for comfort and pillows used for support. Goal: Control of acute pain  Description: Control of acute pain  Outcome: Ongoing  Goal: Control of chronic pain  Description: Control of chronic pain  Outcome: Ongoing     Problem: Falls - Risk of:  Goal: Will remain free from falls  Description: Will remain free from falls  Outcome: Ongoing  Note: Patient using call light appropriately to call for assistance with ambulation to the bathroom and to chair. Patient is compliant with use of non-skid slippers. Patient reports understanding of fall prevention when discussed. Bed alarm remains in place. Goal: Absence of physical injury  Description: Absence of physical injury  Outcome: Ongoing  Note: Patient is free from physical injury at this time. Problem: Discharge Planning:  Goal: Discharged to appropriate level of care  Description: Discharged to appropriate level of care  Outcome: Ongoing  Note: Patient plans to return home at discharge. Care plan reviewed with patient. Patient verbalizes understanding of the plan of care and contributes to goal setting.

## 2021-03-19 NOTE — CARE COORDINATION
3/19/21, 10:31 AM EDT    Patient goals/plan/ treatment preferences discussed by  and . Patient goals/plan/ treatment preferences reviewed with patient/ family. Patient/ family verbalize understanding of discharge plan and are in agreement with goal/plan/treatment preferences. Understanding was demonstrated using the teach back method. AVS provided by RN at time of discharge, which includes all necessary medical information pertaining to the patients current course of illness, treatment, post-discharge goals of care, and treatment preferences. Home today. No discharge needs voiced.  F/U with Dr Floridalma Redmond as outpatient

## 2021-03-19 NOTE — OP NOTE
[address] [place] [address]                                OPERATIVE REPORT    PATIENT NAME: Sammi Ramesh                       :        1955  MED REC NO:   [id number]                           ROOM:       0007  ACCOUNT NO:   [de-identified]                           ADMIT DATE: 2021  PROVIDER:     Diaz Avery D.O.    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 POSTOPERATIVE DIAGNOSIS:  Right shoulder massive irreparable rotator  cuff tear with mild glenohumeral arthritis. OPERATION PERFORMED:  Right reverse total shoulder arthroplasty. SURGEON:  Diaz Avery DO    ASSISTANT:  Getachew Barkley PA-C, who helped with retraction,  positioning and closure. TYPE OF ANESTHESIA:  General, regional.    BLOOD LOSS:  250 mL. IMPLANTS:  Biomet comprehensive reverse total shoulder arthroplasty  system with a 13 mini stem and a mini base plate with 36 standard  glenosphere as well as standard poly. There was also a 25 central  screw, 25 inferior screw and 30 superior screw. INDICATIONS FOR OPERATION:  The patient is a 79-year-old male who  presented to my office with chronic right shoulder pain. He had  previously undergone a shoulder arthroscopy in the past where they  determined he had irreparable rotator cuff tear and performed a  debridement. He has since had considerable nonsurgical treatment. I  discussed multiple treatment options with him. The patient elected to  proceed with reverse shoulder arthroplasty. OPERATIVE SUMMARY:  The patient was met in the preop holding area and  the correct extremity was identified and marked. Informed consent was  obtained. A nerve block was performed per the Anesthesia team.  He was  escorted to the operative suite. General anesthesia was administered.    He was then positioned in the semi-sitting beach-chair position. He was  prepped and draped in standard surgical fashion. The arm was positioned  in a spider arm beltrán. Appropriate preoperative antibiotics and  tranexamic acid were administered before incision. I then marked out an  incision with standard deltopectoral approach. A timeout was taken. Correct patient, procedure and operative site were agreed upon by all  that were present. I then made an incision just proximal to the  coracoid, extending towards the humerus. Bovie dissection was carried  down to the cephalic vein. The said vein was identified and retracted  laterally with the deltoid. Clavipectoral fascia was opened. I  developed the subacromial, subcoracoid and subdeltoid spaces. I  released approximately 1.5 cm of pec major tendon. Biceps had already  been tenotomized. There was a complete retracted rotator cuff tear of  the entire subscapularis, supraspinatus and infraspinatus. The CA  ligament was also very thin from the release in prior surgery. The  humeral head was significantly elevated almost anteriorly dislocated. The humeral head was dislocated the remainder of the way and I started  with the opening reamer. I then reamed to a size 13 and then I made my  cut using the cutting guide in 20 degrees of retroversion. I broached  up to size 13 with a very good press-fit. I then placed my retractors  for glenoid exposure and circumferentially excised the labrum. After  adequate glenoid exposure, I placed a guidewire in the center position. This was done with a guide with 10 degrees of inferior tilt. There was  a small amount of superior wear. There were early osteophytes around  the inferior humeral head as well as remodelling of the superior humeral  head. The inferior osteophytes were removed earlier in the procedure. I then reamed for the mini baseplate.   I removed some of the inferior  bone as this was eccentric to bring more inferior to superior. I then  placed a 25 central screw with great purchase. I then placed locking  screws inferior and superior which were 25 and 30 mm. The wound was  copiously irrigated and I placed the glenosphere. This had a great fit  on the donny taper and could not easily be removed. I then turned my  attention back to the humerus and placed the trial standard component. I could not get the shoulder reduced, it was too tight. I suspect this  is from being chronically superior and anteriorly elevated from the  massive rotator cuff tear. I then replaced the reamer and took another  2-mm cut. I then replaced the 13-broach and trialed again. A standard  poly fit great. I removed the trial components, irrigated, and placed a  final 13 mini stem and standard tray. I could reduce the shoulder with  appropriate tension. The shoulder was taken through a range of motion,  was very stable. The wound was irrigated again. I then closed the  deltopectoral fascia with 0 Vicryl suture and the remainder of incision  was closed in layered fashion with 0 Vicryl, 2-0 Vicryl and 3-0  Monocryl. A sterile dressing was applied. Sling was applied. The  patient was awakened from anesthesia. There are no complications. He  will follow up in two weeks for suture removal and to begin physical  therapy.         Shashi Garcia D.O.    D: 03/18/2021 10:19:22       T: 03/18/2021 10:29:04     [email or web address]  Job#: [id number]     Doc#: [id number]    CC:

## 2021-03-19 NOTE — PROGRESS NOTES
Discharge instructions given to patient at this time. All questions answered. All belongings packed and ready for discharge. Transport called to assist to private car via wheelchair.

## 2021-03-19 NOTE — DISCHARGE SUMMARY
Orthopaedic Discharge Summary     Patient ID:  Viviana Paulson  [id number]  63 y.o.  1955    Admit date: 3/18/2021    Discharge date and time: 3/19/2021    Admitting Physician: Corina Mcwilliams DO     Discharge Physician: same    Admission Diagnoses: right shoulder complete rotator cuff tear with mild shoulder [address] osteoarthritis    Discharge Diagnoses: same    Admission Condition: good    Discharged Condition: good    Indication for Admission: Elective right reverse total shoulder arthroplasty     Surgical procedure: Right reverse total shoulder arthroplasty    Consults: none    Significant Diagnostic Studies: radiology: X-Ray: post op xrays right shoulder show   Satisfactory postsurgical appearance of right shoulder prosthesis. Hospital Course: Patient had elective surgery as noted above with Dr. Byron West on 3/18/21 and was admitted for observation for pain control and medical management. Xrays taken after surgery in PACU were within normal limits. Patient had labs taken also WNL. Patient was seen morning of 3/19/21 walking and in pleasant mood stating pain was well controlled and doing well overall. Patient was able to be discharged and will follow up with Dr. Byron West outpatient. Disposition: home    Patient Instructions:      Medication List      START taking these medications    HYDROcodone-acetaminophen 5-325 MG per tablet  Commonly known as: Norco  Take 1-2 tablets by mouth every 4 hours as needed for Pain for up to 7 days. Intended supply: 7 days.  Take lowest dose possible to manage pain        CONTINUE taking these medications    aspirin 81 MG EC tablet     atorvastatin 20 MG tablet  Commonly known as: LIPITOR     baclofen 10 MG tablet  Commonly known as: LIORESAL     folic acid 1 MG tablet  Commonly known as: FOLVITE     furosemide 40 MG tablet  Commonly known as: LASIX     meloxicam 15 MG tablet  Commonly known as: MOBIC     metoprolol tartrate 50 MG tablet  Commonly known as: [employer] polyethylene glycol 17 GM/SCOOP powder  Commonly known as: GLYCOLAX     vitamin D 25 MCG (1000 UT) Tabs tablet  Commonly known as: CHOLECALCIFEROL     zolpidem 10 MG tablet  Commonly known as: AMBIEN        STOP taking these medications    traMADol 50 MG tablet  Commonly known as: ULTRAM           Where to Get Your Medications      You can get these medications from any pharmacy    Bring a paper prescription for each of these medications  · HYDROcodone-acetaminophen 5-325 MG per tablet         Activity: Will maintain in sling at all times except for hygiene and to come out to stretch elbow, wrist, and hand as well as gentle shoulder pendulum swings. NWB RUE. Diet: regular diet  Wound Care: keep wound clean and dry for 3 days, then may get wet with shower water leaving dressing in place unless it becomes saturated. No soaking. Follow-up with Selvin Agrawal DO in 10-14 days.     Signed:  Aimee Alonso PA-C    Electronically signed by Selvin Agrawal DO on 3/24/2021 at 1:01 PM

## 2021-03-22 NOTE — PROGRESS NOTES
CLINICAL PHARMACY NOTE: MEDS TO [address] Select Patient?: No  Total # of Prescriptions Filled: 1   The following medications were delivered to the patient:  Hydrocodone-APAP 5/325mg  Total # of Interventions Completed: 2  Time Spent (min): 30    Additional Documentation:

## (undated) DEVICE — 3M™ IOBAN™ 2 ANTIMICROBIAL INCISE DRAPE 6650EZ: Brand: IOBAN™ 2

## (undated) DEVICE — STRYKER PERFORMANCE SERIES SAGITTAL BLADE: Brand: STRYKER PERFORMANCE SERIES

## (undated) DEVICE — TAPE SURG W4INXL11YD 2IN PERF LINERLESS NONWOVEN MEDFIX EZ

## (undated) DEVICE — Device

## (undated) DEVICE — T-MAX DISPOSABLE FACE MASK 8 PER BOX

## (undated) DEVICE — SPONGE,PEANUT,XRAY,ST,SM,3/8",5/CARD: Brand: MEDLINE INDUSTRIES, INC.

## (undated) DEVICE — BIT DRL DIA2.7MM PERIPH SCR REUSE FOR COMPHSVE REV SHLDR

## (undated) DEVICE — DRESSING TRNSPAR W4XL10IN FLM MIC POR SURESITE 123

## (undated) DEVICE — ADHESIVE SKIN CLSR 0.7ML TOP DERMBND ADV

## (undated) DEVICE — SHOULDER STABILIZATION KIT,                                    DISPOSABLE 12 PER BOX

## (undated) DEVICE — DRESSING,GAUZE,XEROFORM,CURAD,5"X9",ST: Brand: CURAD

## (undated) DEVICE — APPLICATOR MEDICATED 26 CC SOLUTION HI LT ORNG CHLORAPREP

## (undated) DEVICE — SYRINGE IRRIG 60ML SFT PLIABLE BLB EZ TO GRP 1 HND USE W/

## (undated) DEVICE — HYPODERMIC SAFETY NEEDLE: Brand: MAGELLAN

## (undated) DEVICE — INTRODUCER HEMSTAS 8FRX12CM ACT SHTH W/ .038IN GWIRE MAX

## (undated) DEVICE — SUTURE VCRL + SZ 2-0 L27IN ABSRB CLR CT-1 1/2 CIR TAPERCUT VCP259H

## (undated) DEVICE — GLOVE ORANGE PI 7 1/2   MSG9075

## (undated) DEVICE — INTENDED FOR TISSUE SEPARATION, AND OTHER PROCEDURES THAT REQUIRE A SHARP SURGICAL BLADE TO PUNCTURE OR CUT.: Brand: BARD-PARKER ® CARBON RIB-BACK BLADES

## (undated) DEVICE — SUTURE MCRYL + SZ 3-0 L27IN ABSRB UD PS1 L24MM 3/8 CIR REV MCP936H

## (undated) DEVICE — PACK-MAJOR

## (undated) DEVICE — SPONGE LAP W18XL18IN WHT COT 4 PLY FLD STRUNG RADPQ DISP ST

## (undated) DEVICE — DRESSING ANITMICROBIAL FOAM OPTIFOAM POSTOP STRP 35 X 10 IN

## (undated) DEVICE — PACK PROCEDURE SURG SET UP SRMC

## (undated) DEVICE — BASIC SINGLE BASIN BTC-LF: Brand: MEDLINE INDUSTRIES, INC.

## (undated) DEVICE — 3M™ STERI-DRAPE™ U-DRAPE 1015: Brand: STERI-DRAPE™

## (undated) DEVICE — SUTURE VCRL + SZ 0 L27IN ABSRB UD CT-1 L36MM 1/2 CIR TAPR VCP260H

## (undated) DEVICE — HIGH CAPACITY NARROW INTRAMEDULLARY TIP: Brand: PULSAVAC®

## (undated) DEVICE — DRAPE,U/SHT,SPLIT,FILM,60X84,STERILE: Brand: MEDLINE

## (undated) DEVICE — SCREW BNE L20MM DIA6.5MM HEX HD DIA3.5MM FOR COMPHSVE CONV: Type: IMPLANTABLE DEVICE | Site: SHOULDER | Status: NON-FUNCTIONAL

## (undated) DEVICE — SUTURE FIBERWIRE SZ 2 W/ TAPERED NEEDLE BLUE L38IN NONABSORB BLU L26.5MM 1/2 CIRCLE AR7200

## (undated) DEVICE — GOWN,SIRUS,NONRNF,SETINSLV,XL,20/CS: Brand: MEDLINE

## (undated) DEVICE — CAUTERY TIP POLISHER: Brand: DEVON

## (undated) DEVICE — SPONGE GZ W4XL4IN COT 12 PLY TYP VII WVN C FLD DSGN

## (undated) DEVICE — SYRINGE MED 10ML LUERLOCK TIP W/O SFTY DISP

## (undated) DEVICE — GLOVE SURG SZ 75 L12IN THK75MIL DK GRN LTX FREE